# Patient Record
Sex: MALE | Race: WHITE | Employment: OTHER | URBAN - METROPOLITAN AREA
[De-identification: names, ages, dates, MRNs, and addresses within clinical notes are randomized per-mention and may not be internally consistent; named-entity substitution may affect disease eponyms.]

---

## 2024-06-26 ENCOUNTER — TELEPHONE (OUTPATIENT)
Dept: ORTHOPEDIC SURGERY | Age: 79
End: 2024-06-26

## 2024-06-26 NOTE — TELEPHONE ENCOUNTER
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

## 2024-07-01 ENCOUNTER — OFFICE VISIT (OUTPATIENT)
Dept: ORTHOPEDIC SURGERY | Age: 79
End: 2024-07-01
Payer: COMMERCIAL

## 2024-07-01 VITALS — HEIGHT: 69 IN | BODY MASS INDEX: 30.81 KG/M2 | WEIGHT: 208 LBS

## 2024-07-01 DIAGNOSIS — G89.29 CHRONIC PAIN OF LEFT KNEE: Primary | ICD-10-CM

## 2024-07-01 DIAGNOSIS — M25.562 CHRONIC PAIN OF LEFT KNEE: Primary | ICD-10-CM

## 2024-07-01 PROCEDURE — 1123F ACP DISCUSS/DSCN MKR DOCD: CPT | Performed by: EMERGENCY MEDICINE

## 2024-07-01 PROCEDURE — 99203 OFFICE O/P NEW LOW 30 MIN: CPT | Performed by: EMERGENCY MEDICINE

## 2024-07-01 RX ORDER — DAPAGLIFLOZIN 10 MG/1
10 TABLET, FILM COATED ORAL
COMMUNITY
Start: 2024-05-21

## 2024-07-01 RX ORDER — HYDROCHLOROTHIAZIDE 25 MG/1
25 TABLET ORAL DAILY
COMMUNITY
Start: 2021-04-21

## 2024-07-01 RX ORDER — MULTIVITAMIN
1 TABLET ORAL DAILY
COMMUNITY

## 2024-07-01 RX ORDER — VALSARTAN 160 MG/1
160 TABLET ORAL DAILY
COMMUNITY
Start: 2024-01-26

## 2024-07-01 ASSESSMENT — ENCOUNTER SYMPTOMS
ABDOMINAL PAIN: 0
SHORTNESS OF BREATH: 0

## 2024-07-01 NOTE — PROGRESS NOTES
presenting with left knee pain and feelings of instability with full extension.  He had a left total knee replacement by Dr. Bullard on 9/27/2022.  On exam, no overlying erythema or warmth without joint effusion.  Low suspicion for acute infection.  His x-ray reveals a laterally tilted patella, which could be the cause of the patient's pain, questionable whether he is having patellar subluxation, as the cause of his pain.  I would like for him to see an orthopedic surgeon for consultation regarding further workup and definitive management.  I will refer him to Dr. Cristobal Dobbs.  Patient felt comfortable with this plan and all questions were answered.    Orders Placed This Encounter   Procedures    XR KNEE LEFT (3 VIEWS)     Standing Status:   Future     Number of Occurrences:   1     Standing Expiration Date:   7/27/2024     Order Specific Question:   Reason for exam:     Answer:   Cristobal aFgan MD, Orthopedic Surgery (Hip; Knee), [facility]     Referral Priority:   Routine     Referral Type:   Eval and Treat     Referral Reason:   Specialty Services Required     Referred to Provider:   Cristobal Dobbs MD     Requested Specialty:   Orthopedic Surgery     Number of Visits Requested:   1       Follow-up:   Return for consultation with Dr. Dobbs.  Sooner with any problems, questions, concerns, or worsening symptoms.    I spent 35 minutes providing this service today, to include the time spent seeing the patient, documenting, and reviewing chart excluding any separately billed procedures.     Electronically signed by Adrianne Erazo MD on 7/1/2024 at 10:46 AM.    Disclaimer:  This note was dictated with voice recognition software.  Though review and correction are routine, we apologize for any errors.

## 2024-07-08 ENCOUNTER — OFFICE VISIT (OUTPATIENT)
Dept: ORTHOPEDIC SURGERY | Age: 79
End: 2024-07-08
Payer: COMMERCIAL

## 2024-07-08 VITALS — HEIGHT: 69 IN | WEIGHT: 208 LBS | BODY MASS INDEX: 30.81 KG/M2

## 2024-07-08 DIAGNOSIS — G89.29 CHRONIC PAIN OF LEFT KNEE: ICD-10-CM

## 2024-07-08 DIAGNOSIS — T84.093A FAILED TOTAL LEFT KNEE REPLACEMENT, INITIAL ENCOUNTER (HCC): Primary | ICD-10-CM

## 2024-07-08 DIAGNOSIS — M25.562 CHRONIC PAIN OF LEFT KNEE: ICD-10-CM

## 2024-07-08 PROBLEM — Z96.659 FAILED TOTAL KNEE ARTHROPLASTY (HCC): Status: ACTIVE | Noted: 2024-07-08

## 2024-07-08 PROBLEM — T84.018A FAILED TOTAL KNEE ARTHROPLASTY (HCC): Status: ACTIVE | Noted: 2024-07-08

## 2024-07-08 PROCEDURE — 99204 OFFICE O/P NEW MOD 45 MIN: CPT | Performed by: ORTHOPAEDIC SURGERY

## 2024-07-08 PROCEDURE — 1123F ACP DISCUSS/DSCN MKR DOCD: CPT | Performed by: ORTHOPAEDIC SURGERY

## 2024-07-08 RX ORDER — ACETAMINOPHEN 325 MG/1
1000 TABLET ORAL ONCE
OUTPATIENT
Start: 2024-07-08 | End: 2024-07-08

## 2024-07-08 RX ORDER — SODIUM CHLORIDE 9 MG/ML
INJECTION, SOLUTION INTRAVENOUS PRN
OUTPATIENT
Start: 2024-07-08

## 2024-07-08 RX ORDER — SODIUM CHLORIDE 0.9 % (FLUSH) 0.9 %
5-40 SYRINGE (ML) INJECTION PRN
OUTPATIENT
Start: 2024-07-08

## 2024-07-08 RX ORDER — CELECOXIB 100 MG/1
200 CAPSULE ORAL ONCE
OUTPATIENT
Start: 2024-07-08 | End: 2024-07-08

## 2024-07-08 RX ORDER — SODIUM CHLORIDE 0.9 % (FLUSH) 0.9 %
5-40 SYRINGE (ML) INJECTION EVERY 12 HOURS SCHEDULED
OUTPATIENT
Start: 2024-07-08

## 2024-07-09 NOTE — PROGRESS NOTES
Done  Katiuska Barcenas MD     Name_______________________________________Printed:____________________  Date and time of surgery__8/14 1100______________________Arrival Time:_0900____/per office____   1. The instructions given regarding when and if a patient needs to stop oral intake prior to surgery varies.Follow the specific instructions you were given                  _x__Nothing to eat or to drink after Midnight the night before.                   ____Carbo loading or instructions will be given to select patients-if you have been given those instructions -please do the following                           The evening before your surgery after dinner before midnight drink 40 ounces of gatorade.If you are diabetic use sugar free.  The morning of surgery drink 40 ounces of water.This needs to be finished 3 hours prior to your surgery start time.    2. Take the following pills with a small sip of water on the morning of nonesurgery___________________________________________________                  Do not take blood pressure medications ending in pril or sartan the clemente prior to surgery or the morning of surgery. Dr Barnes's patient are not to take any medications the AM of surgery.         3. Aspirin, Ibuprofen, Advil, Naproxen, Vitamin E and other Anti-inflammatory products and supplements should be stopped for 5 -7days before surgery or as directed by your physician.   4. Check with your Doctor regarding stopping Plavix, Coumadin,Eliquis, Lovenox,Effient,Pradaxa,Xarelto, Fragmin or other blood thinners and follow their instructions.ELIQUIS follow your drs orders   5. Do not smoke, and do not drink any alcoholic beverages 24 hours prior to surgery.  This includes NA Beer.Refrain from the usage of any recreational drugs.   6. You may brush your teeth and gargle the morning of surgery.  DO NOT SWALLOW WATER   7. You MUST make arrangements for a responsible adult to stay on site while you are here and take you home after your surgery. You will not  INSTRUCTIONS

## 2024-07-10 NOTE — PROGRESS NOTES
Patient: Emile Nieves  : 1945    MRN: [id number]    Date of Visit: 24    Attending Physician: Cristobal Dobbs MD    History of Present Illness  Mr.. Nieves is a very pleasant 78 y.o. patient with a several month history of left knee pain he describes anterior knee pain worse with getting up from a chair he specifically has difficulty getting on and off his tractor.    He underwent a left total knee arthroplasty about a year and 1/2 to 2 years ago he reports he did well initially however has had worsening discomfort over the last several months.  He denies fevers chills denies swelling erythema of the knee denies any mechanical symptoms.      PMH/PSH:  Past Medical History:   Diagnosis Date    Arthritis     Colon cancer (HCC)     Diastolic CHF, chronic (HCC)     History of pulmonary embolus (PE)     on Eliquis    Hypertension     Type II diabetes mellitus (HCC)      Patient Active Problem List   Diagnosis    Failed total knee arthroplasty (HCC)     Past Surgical History:   Procedure Laterality Date    EYE SURGERY      r cat    JOINT REPLACEMENT      right hip  l knee    SIGMOID COLECTOMY      with chemo       MEDS:  Scheduled Meds:  Continuous Infusions:  PRN Meds:  Current Meds:No current outpatient medications on file.      ALLERGIES:  No Known Allergies      Social History:   Social History     Socioeconomic History    Marital status: Single     Spouse name: Not on file    Number of children: Not on file    Years of education: Not on file    Highest education level: Not on file   Social Needs    Financial resource strain: Not on file    Food insecurity - worry: Not on file    Food insecurity - inability: Not on file    Transportation needs - medical: Not on file    Transportation needs - non-medical: Not on file   Occupational History    Not on file   Tobacco Use    Smoking status: Never Smoker    Smokeless tobacco: Never Used   Substance and Sexual Activity    Alcohol use: No

## 2024-07-15 ENCOUNTER — TELEPHONE (OUTPATIENT)
Dept: ORTHOPEDIC SURGERY | Age: 79
End: 2024-07-15

## 2024-07-15 DIAGNOSIS — M25.562 CHRONIC PAIN OF LEFT KNEE: Primary | ICD-10-CM

## 2024-07-15 DIAGNOSIS — G89.29 CHRONIC PAIN OF LEFT KNEE: Primary | ICD-10-CM

## 2024-07-15 NOTE — TELEPHONE ENCOUNTER
General Question     Subject: BLOOD WORK  Patient and /or Facility Request: Emile Nieves   Contact Number: [phone]     THE PT WANTS TO MAKE SURE THAT HIS BLOOD WORK ORDERS ARE SENT TO THE [facility] SO HE CAN GET HIS BLOOD WORK DRAWN THERE INSTEAD OF DRIVING TO .  PLEASE RETURN HIS CALL AT THE ABOVE #

## 2024-07-16 LAB
ALBUMIN: 4.2 G/DL (ref 3.5–5.2)
ALP BLD-CCNC: 86 IU/L (ref 40–129)
ALT SERPL-CCNC: 34 IU/L (ref 10–40)
ANION GAP SERPL CALCULATED.3IONS-SCNC: 14 MMOL/L (ref 4–16)
APTT: 27.5 SECONDS (ref 23.6–34)
AST SERPL-CCNC: 41 IU/L (ref 10–40)
BASOPHILS ABSOLUTE: 0 %
BASOPHILS ABSOLUTE: 0 THOU/MCL (ref 0–0.2)
BILIRUB SERPL-MCNC: 0.5 MG/DL (ref 0–1.2)
BUN BLDV-MCNC: 16 MG/DL (ref 8–26)
CALCIUM SERPL-MCNC: 10.4 MG/DL (ref 8.5–10.4)
CHLORIDE BLD-SCNC: 103 MEQ/L (ref 98–111)
CO2: 25 MMOL/L (ref 21–31)
CREAT SERPL-MCNC: 1.02 MG/DL (ref 0.7–1.3)
EGFR (CKD-EPI): 75 ML/MIN/1.73 M2
EOSINOPHILS ABSOLUTE: 0.1 THOU/MCL (ref 0.03–0.45)
EOSINOPHILS RELATIVE PERCENT: 2 %
ESTIMATED AVERAGE GLUCOSE: 163 MG/DL
GLUCOSE BLD-MCNC: 91 MG/DL (ref 70–99)
HBA1C MFR BLD: 7.3 % (ref 4–6)
HCT VFR BLD CALC: 44.2 % (ref 40–50)
HEMOGLOBIN: 14.9 G/DL (ref 13.5–16.5)
INR BLD: 1 (ref 0.8–1.2)
LYMPHOCYTES ABSOLUTE: 1.6 THOU/MCL (ref 1–4)
LYMPHOCYTES RELATIVE PERCENT: 26 %
MCH RBC QN AUTO: 31.5 PG (ref 27–33)
MCHC RBC AUTO-ENTMCNC: 33.8 G/DL (ref 32–36)
MCV RBC AUTO: 93.2 FL (ref 82–97)
MONOCYTES ABSOLUTE: 0.7 THOU/MCL (ref 0.2–0.9)
MONOCYTES RELATIVE PERCENT: 10 %
NEUTROPHILS ABSOLUTE: 3.9 THOU/MCL (ref 1.8–7.7)
PDW BLD-RTO: 13.3 %
PLATELET # BLD: 197 THOU/MCL (ref 140–375)
PMV BLD AUTO: 7.6 FL (ref 7.4–11.5)
POTASSIUM SERPL-SCNC: 3.4 MEQ/L (ref 3.6–5.1)
PROTHROMBIN TIME: 13.6 SECONDS (ref 11.7–14.2)
RBC # BLD: 4.74 MIL/MCL (ref 4.4–5.8)
SEG NEUTROPHILS: 62 %
SODIUM BLD-SCNC: 142 MEQ/L (ref 135–145)
STAPH AUREUS CAPSULAR POLYSACCHARIDE ENZYME GENE: NEGATIVE
STAPH AUREUS.METHICILLIN RESISTANT DNA: NEGATIVE
TOTAL PROTEIN: 7.4 G/DL (ref 6.4–8.3)
VITAMIN D 25-HYDROXY: 39.3 NG/ML
WBC # BLD: 6.4 THOU/MCL (ref 3.6–10.5)

## 2024-08-08 ENCOUNTER — OFFICE VISIT (OUTPATIENT)
Dept: ORTHOPEDIC SURGERY | Age: 79
End: 2024-08-08
Payer: COMMERCIAL

## 2024-08-08 DIAGNOSIS — Z96.659 FAILURE OF TOTAL KNEE REPLACEMENT, SUBSEQUENT ENCOUNTER: Primary | ICD-10-CM

## 2024-08-08 DIAGNOSIS — T84.018D FAILURE OF TOTAL KNEE REPLACEMENT, SUBSEQUENT ENCOUNTER: Primary | ICD-10-CM

## 2024-08-08 PROCEDURE — 1123F ACP DISCUSS/DSCN MKR DOCD: CPT | Performed by: ORTHOPAEDIC SURGERY

## 2024-08-08 PROCEDURE — 99214 OFFICE O/P EST MOD 30 MIN: CPT | Performed by: ORTHOPAEDIC SURGERY

## 2024-08-08 NOTE — PROGRESS NOTES
Patient: Emile Nieves  : 1945    MRN: [id number]    Date of Visit: 24    Attending Physician: Cristobal Dobbs MD    History of Present Illness  Mr.. Nieves is a very pleasant 78 y.o. patient with a several month history of left knee pain he describes anterior knee pain worse with getting up from a chair he specifically has difficulty getting on and off his tractor.    He underwent a left total knee arthroplasty about a year and 1/2 to 2 years ago he reports he did well initially however has had worsening discomfort over the last several months.  He denies fevers chills denies swelling erythema of the knee denies any mechanical symptoms.    I am seeing her back today to review and discuss her preoperative lab work and to further discuss the perioperative process of TJA.         PMH/PSH:  Past Medical History:   Diagnosis Date    Arthritis     Colon cancer (HCC)     Diastolic CHF, chronic (HCC)     History of pulmonary embolus (PE)     on Eliquis    Hypertension     Type II diabetes mellitus (HCC)      Patient Active Problem List   Diagnosis    Failed total knee arthroplasty (HCC)     Past Surgical History:   Procedure Laterality Date    EYE SURGERY      r cat    JOINT REPLACEMENT      right hip  l knee    SIGMOID COLECTOMY      with chemo       MEDS:  Scheduled Meds:  Continuous Infusions:  PRN Meds:  Current Meds:No current outpatient medications on file.      ALLERGIES:  No Known Allergies      Social History:   Social History     Socioeconomic History    Marital status: Single     Spouse name: Not on file    Number of children: Not on file    Years of education: Not on file    Highest education level: Not on file   Social Needs    Financial resource strain: Not on file    Food insecurity - worry: Not on file    Food insecurity - inability: Not on file    Transportation needs - medical: Not on file    Transportation needs - non-medical: Not on file   Occupational History    Not

## 2024-08-14 ENCOUNTER — APPOINTMENT (OUTPATIENT)
Dept: GENERAL RADIOLOGY | Age: 79
DRG: 313 | End: 2024-08-14
Attending: ORTHOPAEDIC SURGERY
Payer: COMMERCIAL

## 2024-08-14 ENCOUNTER — ANESTHESIA (OUTPATIENT)
Dept: OPERATING ROOM | Age: 79
DRG: 313 | End: 2024-08-14
Payer: COMMERCIAL

## 2024-08-14 ENCOUNTER — ANESTHESIA EVENT (OUTPATIENT)
Dept: OPERATING ROOM | Age: 79
DRG: 313 | End: 2024-08-14
Payer: COMMERCIAL

## 2024-08-14 ENCOUNTER — HOSPITAL ENCOUNTER (INPATIENT)
Age: 79
LOS: 1 days | Discharge: HOME | DRG: 313 | End: 2024-08-14
Attending: ORTHOPAEDIC SURGERY | Admitting: ORTHOPAEDIC SURGERY
Payer: COMMERCIAL

## 2024-08-14 VITALS
TEMPERATURE: 97.5 F | SYSTOLIC BLOOD PRESSURE: 108 MMHG | OXYGEN SATURATION: 94 % | DIASTOLIC BLOOD PRESSURE: 67 MMHG | HEART RATE: 80 BPM | RESPIRATION RATE: 12 BRPM | WEIGHT: 206 LBS | HEIGHT: 69 IN | BODY MASS INDEX: 30.51 KG/M2

## 2024-08-14 DIAGNOSIS — T84.093A FAILED TOTAL LEFT KNEE REPLACEMENT, INITIAL ENCOUNTER (HCC): Primary | ICD-10-CM

## 2024-08-14 LAB
ABO + RH BLD: NORMAL
BLD GP AB SCN SERPL QL: NORMAL
GLUCOSE BLD-MCNC: 113 MG/DL (ref 70–99)
GLUCOSE BLD-MCNC: 86 MG/DL (ref 70–99)
PERFORMED ON: ABNORMAL
PERFORMED ON: NORMAL

## 2024-08-14 PROCEDURE — 3600000004 HC SURGERY LEVEL 4 BASE: Performed by: ORTHOPAEDIC SURGERY

## 2024-08-14 PROCEDURE — 7100000001 HC PACU RECOVERY - ADDTL 15 MIN: Performed by: ORTHOPAEDIC SURGERY

## 2024-08-14 PROCEDURE — 7100000011 HC PHASE II RECOVERY - ADDTL 15 MIN: Performed by: ORTHOPAEDIC SURGERY

## 2024-08-14 PROCEDURE — 6360000002 HC RX W HCPCS: Performed by: ORTHOPAEDIC SURGERY

## 2024-08-14 PROCEDURE — 3700000001 HC ADD 15 MINUTES (ANESTHESIA): Performed by: ORTHOPAEDIC SURGERY

## 2024-08-14 PROCEDURE — 2500000003 HC RX 250 WO HCPCS: Performed by: NURSE ANESTHETIST, CERTIFIED REGISTERED

## 2024-08-14 PROCEDURE — 2580000003 HC RX 258: Performed by: ORTHOPAEDIC SURGERY

## 2024-08-14 PROCEDURE — 86900 BLOOD TYPING SEROLOGIC ABO: CPT

## 2024-08-14 PROCEDURE — 1200000000 HC SEMI PRIVATE

## 2024-08-14 PROCEDURE — 6370000000 HC RX 637 (ALT 250 FOR IP): Performed by: ANESTHESIOLOGY

## 2024-08-14 PROCEDURE — 86850 RBC ANTIBODY SCREEN: CPT

## 2024-08-14 PROCEDURE — 0QBF0ZZ EXCISION OF LEFT PATELLA, OPEN APPROACH: ICD-10-PCS | Performed by: ORTHOPAEDIC SURGERY

## 2024-08-14 PROCEDURE — 6360000002 HC RX W HCPCS: Performed by: NURSE ANESTHETIST, CERTIFIED REGISTERED

## 2024-08-14 PROCEDURE — 6370000000 HC RX 637 (ALT 250 FOR IP): Performed by: ORTHOPAEDIC SURGERY

## 2024-08-14 PROCEDURE — 27425 LAT RETINACULAR RELEASE OPEN: CPT | Performed by: ORTHOPAEDIC SURGERY

## 2024-08-14 PROCEDURE — 97161 PT EVAL LOW COMPLEX 20 MIN: CPT

## 2024-08-14 PROCEDURE — 97535 SELF CARE MNGMENT TRAINING: CPT

## 2024-08-14 PROCEDURE — 3600000014 HC SURGERY LEVEL 4 ADDTL 15MIN: Performed by: ORTHOPAEDIC SURGERY

## 2024-08-14 PROCEDURE — 97530 THERAPEUTIC ACTIVITIES: CPT

## 2024-08-14 PROCEDURE — 73560 X-RAY EXAM OF KNEE 1 OR 2: CPT

## 2024-08-14 PROCEDURE — 27350 REMOVAL OF KNEECAP: CPT | Performed by: ORTHOPAEDIC SURGERY

## 2024-08-14 PROCEDURE — 7100000010 HC PHASE II RECOVERY - FIRST 15 MIN: Performed by: ORTHOPAEDIC SURGERY

## 2024-08-14 PROCEDURE — 86901 BLOOD TYPING SEROLOGIC RH(D): CPT

## 2024-08-14 PROCEDURE — 2500000003 HC RX 250 WO HCPCS: Performed by: ORTHOPAEDIC SURGERY

## 2024-08-14 PROCEDURE — 36415 COLL VENOUS BLD VENIPUNCTURE: CPT

## 2024-08-14 PROCEDURE — 2709999900 HC NON-CHARGEABLE SUPPLY: Performed by: ORTHOPAEDIC SURGERY

## 2024-08-14 PROCEDURE — 3700000000 HC ANESTHESIA ATTENDED CARE: Performed by: ORTHOPAEDIC SURGERY

## 2024-08-14 PROCEDURE — 7100000000 HC PACU RECOVERY - FIRST 15 MIN: Performed by: ORTHOPAEDIC SURGERY

## 2024-08-14 PROCEDURE — 97116 GAIT TRAINING THERAPY: CPT

## 2024-08-14 PROCEDURE — 0MNP0ZZ RELEASE LEFT KNEE BURSA AND LIGAMENT, OPEN APPROACH: ICD-10-PCS | Performed by: ORTHOPAEDIC SURGERY

## 2024-08-14 PROCEDURE — 97165 OT EVAL LOW COMPLEX 30 MIN: CPT

## 2024-08-14 PROCEDURE — 27350 REMOVAL OF KNEECAP: CPT | Performed by: PHYSICIAN ASSISTANT

## 2024-08-14 RX ORDER — LABETALOL HYDROCHLORIDE 5 MG/ML
10 INJECTION, SOLUTION INTRAVENOUS
Status: DISCONTINUED | OUTPATIENT
Start: 2024-08-14 | End: 2024-08-14 | Stop reason: HOSPADM

## 2024-08-14 RX ORDER — HYDROMORPHONE HYDROCHLORIDE 2 MG/ML
0.5 INJECTION, SOLUTION INTRAMUSCULAR; INTRAVENOUS; SUBCUTANEOUS EVERY 5 MIN PRN
Status: DISCONTINUED | OUTPATIENT
Start: 2024-08-14 | End: 2024-08-14 | Stop reason: HOSPADM

## 2024-08-14 RX ORDER — MAGNESIUM HYDROXIDE 1200 MG/15ML
LIQUID ORAL CONTINUOUS PRN
Status: COMPLETED | OUTPATIENT
Start: 2024-08-14 | End: 2024-08-14

## 2024-08-14 RX ORDER — ASPIRIN 81 MG/1
81 TABLET ORAL 2 TIMES DAILY
Status: CANCELLED | OUTPATIENT
Start: 2024-08-14

## 2024-08-14 RX ORDER — HYDRALAZINE HYDROCHLORIDE 20 MG/ML
10 INJECTION INTRAMUSCULAR; INTRAVENOUS
Status: DISCONTINUED | OUTPATIENT
Start: 2024-08-14 | End: 2024-08-14 | Stop reason: HOSPADM

## 2024-08-14 RX ORDER — ONDANSETRON 2 MG/ML
INJECTION INTRAMUSCULAR; INTRAVENOUS PRN
Status: DISCONTINUED | OUTPATIENT
Start: 2024-08-14 | End: 2024-08-14 | Stop reason: SDUPTHER

## 2024-08-14 RX ORDER — HYDROCHLOROTHIAZIDE 25 MG/1
25 TABLET ORAL DAILY
Status: CANCELLED | OUTPATIENT
Start: 2024-08-15

## 2024-08-14 RX ORDER — ROCURONIUM BROMIDE 10 MG/ML
INJECTION, SOLUTION INTRAVENOUS PRN
Status: DISCONTINUED | OUTPATIENT
Start: 2024-08-14 | End: 2024-08-14 | Stop reason: SDUPTHER

## 2024-08-14 RX ORDER — SODIUM CHLORIDE 0.9 % (FLUSH) 0.9 %
5-40 SYRINGE (ML) INJECTION EVERY 12 HOURS SCHEDULED
Status: CANCELLED | OUTPATIENT
Start: 2024-08-14

## 2024-08-14 RX ORDER — FAMOTIDINE 20 MG/1
20 TABLET, FILM COATED ORAL 2 TIMES DAILY
Status: CANCELLED | OUTPATIENT
Start: 2024-08-14

## 2024-08-14 RX ORDER — ONDANSETRON 2 MG/ML
4 INJECTION INTRAMUSCULAR; INTRAVENOUS
Status: DISCONTINUED | OUTPATIENT
Start: 2024-08-14 | End: 2024-08-14 | Stop reason: HOSPADM

## 2024-08-14 RX ORDER — PROPOFOL 10 MG/ML
INJECTION, EMULSION INTRAVENOUS PRN
Status: DISCONTINUED | OUTPATIENT
Start: 2024-08-14 | End: 2024-08-14 | Stop reason: SDUPTHER

## 2024-08-14 RX ORDER — SODIUM CHLORIDE 0.9 % (FLUSH) 0.9 %
5-40 SYRINGE (ML) INJECTION PRN
Status: CANCELLED | OUTPATIENT
Start: 2024-08-14

## 2024-08-14 RX ORDER — KETOROLAC TROMETHAMINE 30 MG/ML
15 INJECTION, SOLUTION INTRAMUSCULAR; INTRAVENOUS EVERY 6 HOURS
Status: CANCELLED | OUTPATIENT
Start: 2024-08-14 | End: 2024-08-15

## 2024-08-14 RX ORDER — ACETAMINOPHEN 500 MG
1000 TABLET ORAL 3 TIMES DAILY
Qty: 84 TABLET | Refills: 0 | Status: SHIPPED | OUTPATIENT
Start: 2024-08-14

## 2024-08-14 RX ORDER — VALSARTAN 160 MG/1
160 TABLET ORAL DAILY
Status: CANCELLED | OUTPATIENT
Start: 2024-08-15

## 2024-08-14 RX ORDER — 0.9 % SODIUM CHLORIDE 0.9 %
1000 INTRAVENOUS SOLUTION INTRAVENOUS ONCE
Status: CANCELLED | OUTPATIENT
Start: 2024-08-14 | End: 2024-08-14

## 2024-08-14 RX ORDER — ACETAMINOPHEN 500 MG
1000 TABLET ORAL ONCE
Status: COMPLETED | OUTPATIENT
Start: 2024-08-14 | End: 2024-08-14

## 2024-08-14 RX ORDER — NALOXONE HYDROCHLORIDE 0.4 MG/ML
INJECTION, SOLUTION INTRAMUSCULAR; INTRAVENOUS; SUBCUTANEOUS PRN
Status: DISCONTINUED | OUTPATIENT
Start: 2024-08-14 | End: 2024-08-14 | Stop reason: HOSPADM

## 2024-08-14 RX ORDER — GLIPIZIDE 10 MG/1
10 TABLET ORAL
COMMUNITY

## 2024-08-14 RX ORDER — SENNA AND DOCUSATE SODIUM 50; 8.6 MG/1; MG/1
1 TABLET, FILM COATED ORAL DAILY
Qty: 14 TABLET | Refills: 0 | Status: SHIPPED | OUTPATIENT
Start: 2024-08-14

## 2024-08-14 RX ORDER — GLIPIZIDE 5 MG/1
10 TABLET ORAL
Status: CANCELLED | OUTPATIENT
Start: 2024-08-15

## 2024-08-14 RX ORDER — DIPHENHYDRAMINE HYDROCHLORIDE 50 MG/ML
25 INJECTION INTRAMUSCULAR; INTRAVENOUS EVERY 6 HOURS PRN
Status: CANCELLED | OUTPATIENT
Start: 2024-08-14

## 2024-08-14 RX ORDER — DEXTROSE MONOHYDRATE 100 MG/ML
INJECTION, SOLUTION INTRAVENOUS CONTINUOUS PRN
Status: CANCELLED | OUTPATIENT
Start: 2024-08-14

## 2024-08-14 RX ORDER — FENTANYL CITRATE 50 UG/ML
INJECTION, SOLUTION INTRAMUSCULAR; INTRAVENOUS PRN
Status: DISCONTINUED | OUTPATIENT
Start: 2024-08-14 | End: 2024-08-14 | Stop reason: SDUPTHER

## 2024-08-14 RX ORDER — SODIUM CHLORIDE 9 MG/ML
INJECTION, SOLUTION INTRAVENOUS PRN
Status: DISCONTINUED | OUTPATIENT
Start: 2024-08-14 | End: 2024-08-14 | Stop reason: HOSPADM

## 2024-08-14 RX ORDER — SENNA AND DOCUSATE SODIUM 50; 8.6 MG/1; MG/1
1 TABLET, FILM COATED ORAL 2 TIMES DAILY
Status: CANCELLED | OUTPATIENT
Start: 2024-08-14

## 2024-08-14 RX ORDER — FAMOTIDINE 10 MG/ML
20 INJECTION, SOLUTION INTRAVENOUS 2 TIMES DAILY
Status: CANCELLED | OUTPATIENT
Start: 2024-08-14

## 2024-08-14 RX ORDER — OXYCODONE HYDROCHLORIDE 5 MG/1
5 TABLET ORAL EVERY 4 HOURS PRN
Qty: 42 TABLET | Refills: 0 | Status: SHIPPED | OUTPATIENT
Start: 2024-08-14 | End: 2024-08-21

## 2024-08-14 RX ORDER — VANCOMYCIN HYDROCHLORIDE 1 G/20ML
INJECTION, POWDER, LYOPHILIZED, FOR SOLUTION INTRAVENOUS
Status: COMPLETED | OUTPATIENT
Start: 2024-08-14 | End: 2024-08-14

## 2024-08-14 RX ORDER — OXYCODONE HYDROCHLORIDE 5 MG/1
5 TABLET ORAL EVERY 4 HOURS PRN
Status: CANCELLED | OUTPATIENT
Start: 2024-08-14

## 2024-08-14 RX ORDER — SODIUM CHLORIDE 9 MG/ML
INJECTION, SOLUTION INTRAVENOUS CONTINUOUS
Status: CANCELLED | OUTPATIENT
Start: 2024-08-14

## 2024-08-14 RX ORDER — DIPHENHYDRAMINE HCL 25 MG
25 TABLET ORAL EVERY 6 HOURS PRN
Status: CANCELLED | OUTPATIENT
Start: 2024-08-14

## 2024-08-14 RX ORDER — DEXAMETHASONE SODIUM PHOSPHATE 4 MG/ML
INJECTION, SOLUTION INTRA-ARTICULAR; INTRALESIONAL; INTRAMUSCULAR; INTRAVENOUS; SOFT TISSUE PRN
Status: DISCONTINUED | OUTPATIENT
Start: 2024-08-14 | End: 2024-08-14 | Stop reason: SDUPTHER

## 2024-08-14 RX ORDER — SODIUM CHLORIDE 0.9 % (FLUSH) 0.9 %
5-40 SYRINGE (ML) INJECTION EVERY 12 HOURS SCHEDULED
Status: DISCONTINUED | OUTPATIENT
Start: 2024-08-14 | End: 2024-08-14 | Stop reason: HOSPADM

## 2024-08-14 RX ORDER — ACETAMINOPHEN 500 MG
1000 TABLET ORAL 3 TIMES DAILY
Status: CANCELLED | OUTPATIENT
Start: 2024-08-14

## 2024-08-14 RX ORDER — OXYCODONE HYDROCHLORIDE 5 MG/1
5 TABLET ORAL
Status: COMPLETED | OUTPATIENT
Start: 2024-08-14 | End: 2024-08-14

## 2024-08-14 RX ORDER — SODIUM CHLORIDE 0.9 % (FLUSH) 0.9 %
5-40 SYRINGE (ML) INJECTION PRN
Status: DISCONTINUED | OUTPATIENT
Start: 2024-08-14 | End: 2024-08-14 | Stop reason: HOSPADM

## 2024-08-14 RX ORDER — CELECOXIB 200 MG/1
200 CAPSULE ORAL ONCE
Status: COMPLETED | OUTPATIENT
Start: 2024-08-14 | End: 2024-08-14

## 2024-08-14 RX ORDER — LIDOCAINE HYDROCHLORIDE 20 MG/ML
INJECTION, SOLUTION EPIDURAL; INFILTRATION; INTRACAUDAL; PERINEURAL PRN
Status: DISCONTINUED | OUTPATIENT
Start: 2024-08-14 | End: 2024-08-14 | Stop reason: SDUPTHER

## 2024-08-14 RX ORDER — DEXMEDETOMIDINE HYDROCHLORIDE 100 UG/ML
INJECTION, SOLUTION INTRAVENOUS PRN
Status: DISCONTINUED | OUTPATIENT
Start: 2024-08-14 | End: 2024-08-14 | Stop reason: SDUPTHER

## 2024-08-14 RX ORDER — SODIUM CHLORIDE 9 MG/ML
INJECTION, SOLUTION INTRAVENOUS PRN
Status: CANCELLED | OUTPATIENT
Start: 2024-08-14

## 2024-08-14 RX ORDER — GLUCAGON 1 MG/ML
1 KIT INJECTION PRN
Status: CANCELLED | OUTPATIENT
Start: 2024-08-14

## 2024-08-14 RX ADMIN — PHENYLEPHRINE HYDROCHLORIDE 100 MCG: 10 INJECTION INTRAVENOUS at 11:41

## 2024-08-14 RX ADMIN — CELECOXIB 200 MG: 200 CAPSULE ORAL at 09:19

## 2024-08-14 RX ADMIN — OXYCODONE HYDROCHLORIDE 5 MG: 5 TABLET ORAL at 13:31

## 2024-08-14 RX ADMIN — FENTANYL CITRATE 100 MCG: 50 INJECTION, SOLUTION INTRAMUSCULAR; INTRAVENOUS at 11:01

## 2024-08-14 RX ADMIN — ROCURONIUM BROMIDE 50 MG: 10 INJECTION, SOLUTION INTRAVENOUS at 11:01

## 2024-08-14 RX ADMIN — PROPOFOL 150 MG: 10 INJECTION, EMULSION INTRAVENOUS at 11:01

## 2024-08-14 RX ADMIN — DEXAMETHASONE SODIUM PHOSPHATE 8 MG: 4 INJECTION, SOLUTION INTRAMUSCULAR; INTRAVENOUS at 11:04

## 2024-08-14 RX ADMIN — DEXMEDETOMIDINE HYDROCHLORIDE 10 MCG: 100 INJECTION, SOLUTION INTRAVENOUS at 11:59

## 2024-08-14 RX ADMIN — ONDANSETRON 4 MG: 2 INJECTION INTRAMUSCULAR; INTRAVENOUS at 11:04

## 2024-08-14 RX ADMIN — CEFAZOLIN 2000 MG: 2 INJECTION, POWDER, FOR SOLUTION INTRAMUSCULAR; INTRAVENOUS at 11:12

## 2024-08-14 RX ADMIN — LIDOCAINE HYDROCHLORIDE 100 MG: 20 INJECTION, SOLUTION EPIDURAL; INFILTRATION; INTRACAUDAL; PERINEURAL at 11:01

## 2024-08-14 RX ADMIN — PHENYLEPHRINE HYDROCHLORIDE 100 MCG: 10 INJECTION INTRAVENOUS at 11:59

## 2024-08-14 RX ADMIN — PHENYLEPHRINE HYDROCHLORIDE 100 MCG: 10 INJECTION INTRAVENOUS at 11:50

## 2024-08-14 RX ADMIN — ACETAMINOPHEN 1000 MG: 500 TABLET ORAL at 09:19

## 2024-08-14 RX ADMIN — SUGAMMADEX 200 MG: 100 INJECTION, SOLUTION INTRAVENOUS at 12:03

## 2024-08-14 RX ADMIN — SODIUM CHLORIDE: 9 INJECTION, SOLUTION INTRAVENOUS at 09:38

## 2024-08-14 ASSESSMENT — PAIN SCALES - GENERAL
PAINLEVEL_OUTOF10: 4
PAINLEVEL_OUTOF10: 0

## 2024-08-14 ASSESSMENT — PAIN - FUNCTIONAL ASSESSMENT: PAIN_FUNCTIONAL_ASSESSMENT: 0-10

## 2024-08-14 ASSESSMENT — LIFESTYLE VARIABLES: SMOKING_STATUS: 0

## 2024-08-14 NOTE — DISCHARGE INSTRUCTIONS
Dr. Dobbs Post-op Instructions     Follow-up with Dr. Dobbs in 2 weeks; [phone] ([facility])    Incision care  Should your incision start to drain let our office know.  Continue jose hose on the operative leg (this should be thigh high) during the day, can remove at night.   Okay to shower tomorrow, as long as your post-op waterproof dressing is still in place.  Do not tub bathe or submerge your incision in water.  Should your incision start to drain, let our office know.  You may remove your Therabond dressing on post-op day 7.  Prior to any wound care please wash and dry your hands.  During the day, continue to wear your JOSE stocking on the operative leg. Take the stocking off at night. You do not need to wear a stocking on your non-operative leg. You should wear the compression stocking until your post-op visit, this keeps lower extremity swelling to a minimum and reduces joint stiffness.      Exercise  Unless told otherwise, your operative leg is \"weight bearing as tolerated\".  This means you can put all of your weight on your surgery leg.  You may progress off support as tolerated.   DO NOT place a pillow under your knee.  To elevate the operative leg, elevate the ENTIRE leg.  The knee should not rest in a bent position.  Walk for 5-10 minutes every hour while awake.  Ask your surgeon's office for  FMLA paperwork or a return to work note.    Ice  For pain and swelling, put ice or a cold pack on the area for 10 to 20 minutes at a time. Put a thin cloth between the ice and your skin such as a clean pillow case or thin towel.    Diet  Resume your home diet  Prevent constipation    Medicines   Upon discharge resume your home medications. Take all medications as prescribed. Take a stool softener (Senna/Colace) if taking narcotic pain medications. Stool softeners are only effective if you are adequately hydrated.  Try and drink 6-8 glasses of water or fluids a day, unless otherwise contraindicated.  blood, light headedness, feelings of apprehension. If you experience any of these symptoms call the office or go to the closest ER.     Call 911 anytime you think you may need emergency care. For example, call if:    You passed out (lost consciousness).     You have severe trouble breathing.     You have sudden chest pain and shortness of breath, or you cough up blood.   Call your doctor now or seek immediate medical care if:    You have signs of infection, such as:  Increased pain, swelling, warmth, or redness.  Red streaks leading from the incision.  Pus draining from the incision.  A fever over 101     Sharp calf pain     Your incision comes open and begins to bleed, or the bleeding increases.  If you fall, especially if you are on blood thinners     You have pain that is not controlled with medications, rest, or ice         If you have a medical emergency please call 911 or seek immediate medical help.  [facility]  [address] [place] [address]  [phone]    ANESTHESIA DISCHARGE INSTRUCTIONS    Wear your seatbelt home.  You are under the influence of drugs-do not drink alcohol, drive, operate machinery, make any important decisions or sign any legal documents for 24 hours.  Children should not ride bikes, skate boards or play on gym sets for 24 hours after surgery.  A responsible adult needs to be with you for 24 hours.  You may experience lightheadedness, dizziness, or sleepiness following surgery.  Rest at home today- increase activity as tolerated.  Progress slowly to a regular diet unless your physician has instructed you otherwise. Drink plenty of water.  If persistent nausea and vomiting becomes a problem, call your physician.  Coughing, sore throat and muscle aches are other side effects of anesthesia, and should improve with time.  Do not drive or operate machinery while taking narcotics.  Females of childbearing potential and on hormonal birth control, should use two forms

## 2024-08-14 NOTE — PROGRESS NOTES
Patient to PACU from OR. Drowsy. VSS. Surgical dressing with knee immoblizer in place to left leg. + 1 bilateral pedal pulses. Will monitor.

## 2024-08-14 NOTE — PROGRESS NOTES
Patient is alert, comfortable, VSS. Tolerating PO. Phase 1 recovery completed, will transition to phase 2.

## 2024-08-14 NOTE — PROGRESS NOTES
[facility] - Inpatient Rehabilitation Department   Phone: [phone]    Physical Therapy    [x] Initial Evaluation            [] Daily Treatment Note         [x] Discharge Summary      Patient: Emile Nieves   : 1945   MRN: [id number]   Date of Service:  2024  Admitting Diagnosis: Failed total knee arthroplasty (HCC)  Current Admission Summary: Pt s/p L TKA 24.   Past Medical History:  has a past medical history of Arthritis, Colon cancer (HCC), Diastolic CHF, chronic (HCC), History of pulmonary embolus (PE), Hypertension, and Type II diabetes mellitus (HCC).  Past Surgical History:  has a past surgical history that includes joint replacement; Sigmoid Colectomy; and eye surgery.    Discharge Recommendations: Emile Nieves scored a 19/ on the AM-PAC short mobility form. Current research shows that an AM-PAC score of 18 or greater is typically associated with a discharge to the patient's home setting. Based on the patient's AM-PAC score and their current functional mobility deficits, it is recommended that the patient have 2-3 sessions per week of Physical Therapy at d/c to increase the patient's independence.  At this time, this patient demonstrates the endurance and safety to discharge home with HHPT and a follow up treatment frequency of 2-3x/wk.  Please see assessment section for further patient specific details.    HOME HEALTH CARE: LEVEL 3 SAFETY  - Initial home health evaluation to occur within 24-48 hours, in patient home   - Therapy evaluations in home within 24-48 hours of discharge; including DME and home safety   - Frontload therapy 5 days, then 3x a week   - Therapy to evaluate if patient has Home Health Aide needs for personal care   -  evaluation within 24-48 hours, includes evaluation of resources and insurance to determine [place], [place], LTC, and Medicaid options      If patient discharges prior to next session this note will serve as a discharge  summary.  Please see below for the latest assessment towards goals.      DME Required For Discharge: rolling walker  Precautions/Restrictions: high fall risk, weight bearing  Weight Bearing Restrictions: weight bearing as tolerated  [] Right Upper Extremity  [] Left Upper Extremity [] Right Lower Extremity  [x] Left Lower Extremity     Required Braces/Orthotics: knee immobilizer (PACU RN reporting MD states KI for LLE when OOB until follow-up visit)   [] Right  [x] Left  Positional Restrictions:no positional restrictions    Pre-Admission Information   Lives With:  brother     Type of Home: house  Home Layout: two level, able to live on main level  Home Access: ramped entry  Bathroom Layout: tub/shower unit  Bathroom Equipment: grab bars in shower  Toilet Height: standard height  Home Equipment: standard walker, quad cane  Transfer Assistance: modified independent with use of QC  Ambulation Assistance:modified independent with use of QC  ADL Assistance: independent with all ADL's  IADL Assistance: independent with homemaking tasks  Active :        [x] Yes  [] No  Hand Dominance: [] Left  [x] Right  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   Posture:   Good  Sensation:   denies numbness and tingling  Tone:   Normotonic  ROM:   RLE WFL; L hip and ankle WFL, unable to assess knee due to KI  Strength:   RLE grossly 3+/5; LLE deferred due to surgery  Therapist Clinical Decision Making (Complexity): low complexity  Clinical Presentation: stable      Subjective  General: Pt semi-fowlers in bed upon arrival. Agreeable to PT/OT eval, requesting to use bathroom.   Pain: 0/10  Pain Interventions: not

## 2024-08-14 NOTE — INTERVAL H&P NOTE
Update History & Physical    The patient's History and Physical of August 2, 2024 was reviewed with the patient and I examined the patient. There was no change. The surgical site was confirmed by the patient and me.     Plan: The risks, benefits, expected outcome, and alternative to the recommended procedure have been discussed with the patient. Patient understands and wants to proceed with the procedure.     Electronically signed by Cristobal Dobbs MD on 8/14/2024 at 9:52 AM

## 2024-08-14 NOTE — PROGRESS NOTES
[facility] - Inpatient Rehabilitation Department   Phone: [phone]    Occupational Therapy    [x] Initial Evaluation            [] Daily Treatment Note         [x] Discharge Summary      Patient: Emile Nieves   : 1945   MRN: [id number]   Date of Service:  2024    Admitting Diagnosis:  Failed total knee arthroplasty (HCC)  Current Admission Summary: Pt s/p L TKA 24.   Past Medical History:  has a past medical history of Arthritis, Colon cancer (HCC), Diastolic CHF, chronic (HCC), History of pulmonary embolus (PE), Hypertension, and Type II diabetes mellitus (HCC).  Past Surgical History:  has a past surgical history that includes joint replacement; Sigmoid Colectomy; and eye surgery.    Discharge Recommendations:Emile Nieves scored a  / on the AM-PAC ADL Inpatient form. Current research shows that an AM-PAC score of 18 or greater is typically associated with a discharge to the patient's home setting. Based on the patient's AM-PAC score, and their current ADL deficits, it is recommended that the patient have 2-3 sessions per week of Occupational Therapy at d/c to increase the patient's independence.  At this time, this patient demonstrates the endurance and safety to discharge home with outpatient OT (home vs OP services) and a follow up treatment frequency of 2-3x/wk.   Please see assessment section for further patient specific details.    If patient discharges prior to next session this note will serve as a discharge summary.  Please see below for the latest assessment towards goals.       DME Required For Discharge: Rolling Walker    Precautions/Restrictions: medium fall risk  Positional Restrictions:no positional restrictions  Required Braces/Orthotics: knee immobilizer (PACU RN reporting MD states KI for LLE when OOB until follow-up visit)                   [] Right            [x] Left    Pre-Admission Information   Lives With:  brother                 Type of Home:

## 2024-08-14 NOTE — ANESTHESIA POSTPROCEDURE EVALUATION
Department of Anesthesiology  Postprocedure Note    Patient: Emile Nieves  MRN: [id number]  YOB: 1945  Date of evaluation: 8/14/2024    Procedure Summary       Date: 08/14/24 Room / Location: [address]    Anesthesia Start: 1057 Anesthesia Stop: 1227    Procedure: LEFT KNEE TOTAL ARTHROPLASTY REVISION -MEGHANN (Left: Knee) Diagnosis:       Failed total knee arthroplasty (HCC)      (Failed total knee arthroplasty (HCC) [T84.018A, Z96.659])    Surgeons: Cristobal Dobbs MD Responsible Provider: Quang Murillo MD    Anesthesia Type: general ASA Status: 4            Anesthesia Type: No value filed.    Damien Phase I: Damien Score: 10    Damien Phase II:      Anesthesia Post Evaluation    Patient location during evaluation: PACU  Patient participation: complete - patient participated  Level of consciousness: awake and alert  Airway patency: patent  Nausea & Vomiting: no vomiting and no nausea  Cardiovascular status: hemodynamically stable  Respiratory status: acceptable  Hydration status: stable  Pain management: adequate    No notable events documented.

## 2024-08-14 NOTE — PROGRESS NOTES
Discharge instructions reviewed with patient and his brother. Patient was given a front wheeled walker. Home-care to follow up.     IV removed and patient discharged home.     Has a follow up appointment with Dr. Dobbs on 8/29.

## 2024-08-14 NOTE — INTERVAL H&P NOTE
Update History & Physical    The patient's History and Physical of August 6, 2024 was reviewed with the patient and I examined the patient. There was no change. The surgical site was confirmed by the patient and me.     Plan: The risks, benefits, expected outcome, and alternative to the recommended procedure have been discussed with the patient. Patient understands and wants to proceed with the procedure.     Electronically signed by Cristobal Dobbs MD on 8/14/2024 at 9:51 AM

## 2024-08-14 NOTE — ANESTHESIA PRE PROCEDURE
T84.018A, Z96.659       Past Medical History:        Diagnosis Date   • Arthritis    • Colon cancer (HCC)    • Diastolic CHF, chronic (HCC)    • History of pulmonary embolus (PE)     on Eliquis   • Hypertension    • Type II diabetes mellitus (HCC)        Past Surgical History:        Procedure Laterality Date   • EYE SURGERY      r cat   • JOINT REPLACEMENT      right hip  l knee   • SIGMOID COLECTOMY      with chemo       Social History:    Social History     Tobacco Use   • Smoking status: Former     Current packs/day: 0.00     Types: Cigarettes     Quit date:      Years since quittin.6   • Smokeless tobacco: Never   Substance Use Topics   • Alcohol use: Never                                Counseling given: Not Answered      Vital Signs (Current):   Vitals:    24 1402   Weight: 90.7 kg (200 lb)   Height: 1.753 m (5' 9\")                                              BP Readings from Last 3 Encounters:   No data found for BP       NPO Status:                                                                                 BMI:   Wt Readings from Last 3 Encounters:   24 90.7 kg (200 lb)   24 94.3 kg (208 lb)   24 94.3 kg (208 lb)     Body mass index is 29.53 kg/m².    CBC:   Lab Results   Component Value Date/Time    WBC 6.4 2024 09:59 AM    RBC 4.74 2024 09:59 AM    HGB 14.9 2024 09:59 AM    HCT 44.2 2024 09:59 AM    MCV 93.2 2024 09:59 AM    RDW 13.3 2024 09:59 AM     2024 09:59 AM       CMP:   Lab Results   Component Value Date/Time     2024 09:59 AM    K 3.4 2024 09:59 AM     2024 09:59 AM    CO2 25 2024 09:59 AM    BUN 16 2024 09:59 AM    CREATININE 1.02 2024 09:59 AM    GLUCOSE 91 2024 09:59 AM    CALCIUM 10.4 2024 09:59 AM    BILITOT 0.5 2024 09:59 AM    ALKPHOS 86 2024 09:59 AM    AST 41 2024 09:59 AM    ALT 34 2024 09:59 AM       POC Tests: No

## 2024-08-14 NOTE — DISCHARGE INSTR - COC
Continuity of Care Form    Patient Name: Emile Nieves   :  1945  MRN:  [id number]    Admit date:  2024  Discharge date:  ***    Code Status Order: Full Code   Advance Directives:   Advance Care Flowsheet Documentation        Date/Time Healthcare Directive Type of Healthcare Directive Copy in Chart Healthcare Agent Appointed Healthcare Agent's Name Healthcare Agent's Phone Number    24 0948 No, patient does not have an advance directive for healthcare treatment  --  --  --  --  --                     Admitting Physician:  Cristobal Dobbs MD  PCP: Dov Salazar MD    Discharging Nurse: ***  Discharging Hospital Unit/Room#: OR/NONE  Discharging Unit Phone Number: ***    Emergency Contact:   Extended Emergency Contact Information  Primary Emergency Contact: Darwin NIEVES  Home Phone: [phone]  Relation: Brother/Sister  Preferred language: English   needed? No  Secondary Emergency Contact: Lui Nieves  Home Phone: [phone]  Relation: Brother/Sister    Past Surgical History:  Past Surgical History:   Procedure Laterality Date    EYE SURGERY      r cat    JOINT REPLACEMENT      right hip  l knee    SIGMOID COLECTOMY      with chemo       Immunization History:     There is no immunization history on file for this patient.    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, Z96.659    Failed total left knee replacement, initial encounter ([facility]) T84.093A       Isolation/Infection:   Isolation            No Isolation          Patient Infection Status       None to display            Nurse Assessment:  Last Vital Signs: BP (!) 157/85   Pulse 64   Temp 97.7 °F (36.5 °C) (Temporal)   Resp 16   Ht 1.753 m (5' 9\")   Wt 93.4 kg (206 lb)   SpO2 96%   BMI 30.42 kg/m²     Last documented pain score (0-10 scale): Pain Level: 0  Last Weight:   Wt Readings from Last 1 Encounters:   24 93.4 kg (206 lb)  of Unplanned Readmission:  5           Discharging to Facility/ Agency   Name:   Address:  Phone:  Fax:    Dialysis Facility (if applicable)   Name:  Address:  Dialysis Schedule:  Phone:  Fax:    / signature: {Esignature:[id number]}    PHYSICIAN SECTION    Prognosis: {Prognosis:0456134925}    Condition at Discharge: { Patient Condition:133931821}    Rehab Potential (if transferring to Rehab): {Prognosis:8737571826}    Recommended Labs or Other Treatments After Discharge: ***    Physician Certification: I certify the above information and transfer of Emile Nieves  is necessary for the continuing treatment of the diagnosis listed and that he requires {Admit to Appropriate Level of Care:64192} for {GREATER/LESS:211597120} 30 days.     Update Admission H&P: {CHP DME Changes in HandP:746028125}    PHYSICIAN SIGNATURE:  {Esignature:[id number]}

## 2024-08-15 NOTE — DISCHARGE SUMMARY
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) [T84.018A, Z96.659]  Failed total left knee replacement, initial encounter ([facility]) [T84.093A]    Discharge Diagnoses:   Principal Problem:    Failed total knee arthroplasty (HCC)  Active Problems:    Failed total left knee replacement, initial encounter ([facility])  Resolved Problems:    * No resolved hospital problems. *    Past Medical History:   Diagnosis Date    Arthritis     Colon cancer ([facility])     Diastolic CHF, chronic ([facility])     History of pulmonary embolus (PE)     on Eliquis    Hypertension     Type II diabetes mellitus ([facility])        Indication for Admission: Emile Nieves is a 78 y.o. male who presented with continued left knee knee pain after TKA that was not responsive to conservative measures.    Operations/Procedures Performed:   1. Left knee arthrotomy with patellar debridement     Hospital Course: Patient admitted on 8/14/2024 and underwent abovementioned procedure(s) on 8/14/2024. Tolerated the procedure well with no complications. Please see full operative report for further details regarding the operation.   Pain controlled post-op with IV/oral pain medication. Diet was advanced and tolerated this well. At time of discharge, the patient was tolerating oral food and hydration, voiding spontaneously, had return of bowel function, was ambulating without difficulty, and pain was controlled on oral medications. The patient was determined to be suitable for discharge and the patient felt comfortable with that decision.     Consults: Physical and Occupational Therapy    Significant Diagnostic Studies:   Stable post-op films    Discharge Exam:  /67   Pulse 80   Temp 97.5 °F (36.4 °C) (Temporal)   Resp 12   Ht 1.753 m (5' 9\")   Wt 93.4 kg (206 lb)   SpO2 94%   BMI 30.42 kg/m²      Gen - NAD, AOx3   - voiding spontaneously  Ext - ROM 0-90  Operative leg NVI  Discharge condition:  Stable    Discharge Medications:  ALL MEDICATIONS HAVE BEEN REVIEWED:  Discharge Medication List as of 8/14/2024  4:41 PM        START taking these medications    Details   oxyCODONE (ROXICODONE) 5 MG immediate release tablet Take 1 tablet by mouth every 4 hours as needed for Pain for up to 7 days. Max Daily Amount: 30 mg, Disp-42 tablet, R-0Normal      sennosides-docusate sodium (SENOKOT-S) 8.6-50 MG tablet Take 1 tablet by mouth daily, Disp-14 tablet, R-0Normal      acetaminophen (TYLENOL) 500 MG tablet Take 2 tablets by mouth 3 times daily, Disp-84 tablet, R-0Normal           CONTINUE these medications which have NOT CHANGED    Details   glipiZIDE (GLUCOTROL) 10 MG tablet Take 1 tablet by mouth 2 times daily (before meals)Historical Med      apixaban (ELIQUIS) 2.5 MG TABS tablet Take 1 tablet by mouth 2 times dailyHistorical Med      hydroCHLOROthiazide (HYDRODIURIL) 25 MG tablet 1 tablet dailyHistorical Med      loperamide (IMODIUM) 1 MG/5ML solution Take by mouth dailyHistorical Med      metFORMIN (GLUCOPHAGE) 500 MG tablet Take 1 tablet by mouth 2 times daily (with meals)Historical Med      Multiple Vitamin (MULTIVITAMIN) tablet Take 1 tablet by mouth dailyHistorical Med      valsartan (DIOVAN) 160 MG tablet Take 1 tablet by mouth dailyHistorical Med      vitamin E 100 units capsule Take 180 Units by mouth dailyHistorical Med           STOP taking these medications       FARXIGA 10 MG tablet Comments:   Reason for Stopping:             Due to patient's orthopaedic surgical procedure/condition, patient may require pain medication for up to 6-8 weeks.    Disposition: home    Patient Instructions:   Activity: Ambulate with assistive device every hour while awake.  Wound Care: Keep outer dressing in place x 10 days then remove and leave steri-strips in place until follow-up appt.      Follow-Up:  Future

## 2024-08-15 NOTE — OP NOTE
Patient: Emile Nieves                    : 1945     MRN: [id number]     Date: 24     SURGEON: Cristobal Dobbs MD     ASSISTANT: Kody ROUSE    The above listed assistant was present for the entirety of the procedure and vital for the performance of the procedure. They assisted with positioning, prepping, draping of the patient before the procedure and instrument manipulation, extremity repositioning during the procedure as well as wound closure, dressing application after the procedure. Please note that no intern, resident, or other hospital staff was available to assist during the surgery    PREOPERATIVE DIAGNOSES:     1) Failed left total knee arthroplasty     POSTOPERATIVE DIAGNOSES: Same     PROCEDURES: Left knee arthrotomy, partial patellectomy, open lateral release    ANESTHESIA: General    COMPLICATIONS: none    ESTIMATED BLOOD LOSS: <50cc    SPECIMENS: none    DRAINS: none    TOURNIQUET TIME:  20 minutes    IMPLANTS USED:   * No implants in log *     INDICATIONS: Emile Nieves is a 78 y.o. male with left total knee arthroplasty several years ago he did well initially however has noted over the last 6 months worsening anterolateral knee pain difficulty going up and down stairs.  He denies any patellar instability on review of his x-rays he was found to have a lateral tracking patella as well as subchondral erosion of the patella this was consistent with his clinical exam.   I sided to proceed with removal of his lateral facet as well as the lateral release and scar revision    The risks and benefits of surgery were discussed with the patient, as well as the alternatives to surgery and the expected post-operative course. Informed consent was obtained.     DESCRIPTION OF OPERATION: He was met in the preoperative holding area where the informed consent was reviewed and the operative site was marked.   He was then taken back to the Operating Room. After induction of

## 2024-08-19 ENCOUNTER — TELEPHONE (OUTPATIENT)
Dept: ORTHOPEDIC SURGERY | Age: 79
End: 2024-08-19

## 2024-08-19 DIAGNOSIS — T84.093A FAILED TOTAL LEFT KNEE REPLACEMENT, INITIAL ENCOUNTER (HCC): ICD-10-CM

## 2024-08-19 DIAGNOSIS — G89.29 CHRONIC PAIN OF LEFT KNEE: Primary | ICD-10-CM

## 2024-08-19 DIAGNOSIS — M25.562 CHRONIC PAIN OF LEFT KNEE: Primary | ICD-10-CM

## 2024-08-22 ENCOUNTER — OFFICE VISIT (OUTPATIENT)
Dept: ORTHOPEDIC SURGERY | Age: 79
End: 2024-08-22

## 2024-08-22 VITALS — WEIGHT: 206 LBS | HEIGHT: 69 IN | BODY MASS INDEX: 30.51 KG/M2

## 2024-08-22 DIAGNOSIS — T84.093A FAILED TOTAL LEFT KNEE REPLACEMENT, INITIAL ENCOUNTER (HCC): Primary | ICD-10-CM

## 2024-08-22 PROCEDURE — 99024 POSTOP FOLLOW-UP VISIT: CPT | Performed by: PHYSICIAN ASSISTANT

## 2024-08-22 NOTE — PROGRESS NOTES
Patient: Emile Nieves                  : 1945   MRN: [id number]   Date of Visit: 24     Physician: Cristobal Dobbs MD.     Reason for Visit: Status post Left knee arthrotomy, partial patellectomy, open lateral release     Subjective History of Present Illness:     Emile Nieves is here for regularly scheduled follow-up s/p left knee arthrotomy, partial patellectomy, open lateral release . Reports they are doing well, occasional aches and pains are controlled with over the counter medications. Taking opioid medications sparingly and continuing to wean. They do not report fevers, chills or drainage from the incision site.    He has not been compliant with his knee immobilizer.     Physical Examination??: ?   General: Patient is alert and oriented x 3 and appears comfortable.   Incision is well-approximated, no erythema, fluctuance   Able to perform SLR   Moderate to large effusion  ROM 5-105    SILT throughout LE     Radiographs: AP/lateral of the operative knee with well-positioned total knee arthroplasty. No evidence of fracture subluxation or dislocation. No evidence of migration or loosening.      Assessment and Plan?: The patient is progressing well approximately 1 weeks s/p Left knee arthrotomy, partial patellectomy, open lateral release     1. A thorough discussion was had with the patient concerning the ?postoperative course and the patient is in agreement with the plan.   2. They will continue to wean from pain medications and progress weight bearing   3. He may d/c his knee immobilizer, but we advised that he try to keep his leg as straight as possible. He will not start PT until after his visit next week.   3. He was provided with daily dressing change materials for his knee. He will call if he has any incisional issues.   4. Will see the patient in 1 weeks with  xrays at that time.    Patient was seen and evaluated by myself and Dr. Dobbs  _______________________      Will

## 2024-08-29 ENCOUNTER — OFFICE VISIT (OUTPATIENT)
Dept: ORTHOPEDIC SURGERY | Age: 79
End: 2024-08-29

## 2024-08-29 VITALS — BODY MASS INDEX: 30.51 KG/M2 | WEIGHT: 206 LBS | HEIGHT: 69 IN

## 2024-08-29 DIAGNOSIS — T84.093A FAILED TOTAL LEFT KNEE REPLACEMENT, INITIAL ENCOUNTER (HCC): Primary | ICD-10-CM

## 2024-08-29 PROCEDURE — 99024 POSTOP FOLLOW-UP VISIT: CPT | Performed by: ORTHOPAEDIC SURGERY

## 2024-08-29 RX ORDER — SULFAMETHOXAZOLE/TRIMETHOPRIM 800-160 MG
1 TABLET ORAL 2 TIMES DAILY
Qty: 20 TABLET | Refills: 0 | Status: SHIPPED | OUTPATIENT
Start: 2024-08-29 | End: 2024-09-08

## 2024-08-29 NOTE — PROGRESS NOTES
Patient: Emile Nieves                  : 1945   MRN: [id number]   Date of Visit: 24     Physician: Cristobal Dobbs MD.     Reason for Visit: Status post Left knee arthrotomy, partial patellectomy, open lateral release     Subjective History of Present Illness:     Emile Nieves is here for regularly scheduled follow-up s/p left knee arthrotomy, partial patellectomy, open lateral release . Reports they are doing well, occasional aches and pains are controlled with over the counter medications. They do not report fevers, chills or drainage from the incision site.      Physical Examination??: ?   General: Patient is alert and oriented x 3 and appears comfortable.   Incision is well-approximated. Mild swelling. Moderate erythema about the anterior knee  Able to perform SLR   Moderate to large effusion  ROM 0-110  SILT throughout LE     Radiographs: AP/lateral of the operative knee with well-positioned total knee arthroplasty. No evidence of fracture subluxation or dislocation. No evidence of migration or loosening.      Assessment and Plan?: The patient is progressing well approximately 1 weeks s/p Left knee arthrotomy, partial patellectomy, open lateral release     1. A thorough discussion was had with the patient concerning the ?postoperative course and the patient is in agreement with the plan.   2. They will continue to wean from pain medications and progress weight bearing   3. Will send a 10 course of Bactrim   4. Will see the patient in 2 weeks. He will not need x-rays. He may begin physical therapy.     Patient was seen and evaluated by myself and Dr. Dobbs  _______________________      Will OMA [place]

## 2024-09-23 ENCOUNTER — OFFICE VISIT (OUTPATIENT)
Dept: ORTHOPEDIC SURGERY | Age: 79
End: 2024-09-23

## 2024-09-23 VITALS — WEIGHT: 206 LBS | BODY MASS INDEX: 30.51 KG/M2 | HEIGHT: 69 IN

## 2024-09-23 DIAGNOSIS — T84.018D FAILURE OF TOTAL KNEE REPLACEMENT, SUBSEQUENT ENCOUNTER: Primary | ICD-10-CM

## 2024-09-23 DIAGNOSIS — Z96.659 FAILURE OF TOTAL KNEE REPLACEMENT, SUBSEQUENT ENCOUNTER: Primary | ICD-10-CM

## 2024-09-23 PROCEDURE — 99024 POSTOP FOLLOW-UP VISIT: CPT | Performed by: PHYSICIAN ASSISTANT

## 2025-03-20 ENCOUNTER — OFFICE VISIT (OUTPATIENT)
Dept: ORTHOPEDIC SURGERY | Age: 80
End: 2025-03-20
Payer: COMMERCIAL

## 2025-03-20 DIAGNOSIS — Z96.659 FAILURE OF TOTAL KNEE REPLACEMENT, SUBSEQUENT ENCOUNTER: Primary | ICD-10-CM

## 2025-03-20 DIAGNOSIS — T84.018D FAILURE OF TOTAL KNEE REPLACEMENT, SUBSEQUENT ENCOUNTER: Primary | ICD-10-CM

## 2025-03-20 PROCEDURE — 99213 OFFICE O/P EST LOW 20 MIN: CPT | Performed by: ORTHOPAEDIC SURGERY

## 2025-03-20 PROCEDURE — 1159F MED LIST DOCD IN RCRD: CPT | Performed by: ORTHOPAEDIC SURGERY

## 2025-03-20 PROCEDURE — 1123F ACP DISCUSS/DSCN MKR DOCD: CPT | Performed by: ORTHOPAEDIC SURGERY

## (undated) DEVICE — SUTURE MONOCRYL + SZ 3-0 L27IN ABSRB UD PS1 L24MM 3/8 CIR REV MCP936H

## (undated) DEVICE — MARKER,SKIN,WI/RULER AND LABELS: Brand: MEDLINE

## (undated) DEVICE — APPLICATOR MEDICATED 26 CC SOLUTION HI LT ORNG CHLORAPREP

## (undated) DEVICE — SUTURE MONOCRYL PLUS UNDYED PS 3-0 45CM STRATAFIX SPIRAL

## (undated) DEVICE — 2108 SERIES SAGITTAL BLADE, NO OFFSET  (12.4 X 1.19 X 82.1MM)

## (undated) DEVICE — BANDAGE COMPR W6INXL10YD ST M E WHITE/BEIGE

## (undated) DEVICE — ZIMMER® STERILE DISPOSABLE TOURNIQUET CUFF WITH PLC, DUAL PORT, SINGLE BLADDER, 34 IN. (86 CM)

## (undated) DEVICE — SUTURE VICRYL + SZ 1 L36IN ABSRB UD L36MM CT-1 1/2 CIR VCP947H

## (undated) DEVICE — HOOD: Brand: FLYTE

## (undated) DEVICE — STERILE POLYISOPRENE POWDER-FREE SURGICAL GLOVES WITH EMOLLIENT COATING: Brand: PROTEXIS

## (undated) DEVICE — SYSTEM VAC MIX SGL DBL CLEARMIX 10 PER CA

## (undated) DEVICE — SUTURE VICRYL SZ 0 L36IN ABSRB UD CT-1 L36MM 1/2 CIR TAPR PNT VCP946H

## (undated) DEVICE — SHEET,DRAPE,53X77,STERILE: Brand: MEDLINE

## (undated) DEVICE — TOTAL KNEE: Brand: MEDLINE INDUSTRIES, INC.

## (undated) DEVICE — SUTURE VICRYL + SZ 2-0 L36IN ABSRB UD L36MM CT-1 1/2 CIR VCP945H

## (undated) DEVICE — GLOVE ORANGE PI 8   MSG9080

## (undated) DEVICE — HOOD, PEEL-AWAY: Brand: FLYTE

## (undated) DEVICE — GLOVE ORTHO 7 1/2   MSG9475

## (undated) DEVICE — HYPODERMIC SAFETY NEEDLE: Brand: MAGELLAN

## (undated) DEVICE — SPLINT KNEE UNIV FOR LESS THAN 36IN L20IN FOAM LAM E CNTCT

## (undated) DEVICE — STRYKER PERFORMANCE SERIES SAGITTAL BLADE: Brand: STRYKER PERFORMANCE SERIES

## (undated) DEVICE — SUTURE ABSORBABLE MONOFILAMENT 1 MO-4 36 CM 36 MM VIO PDS +

## (undated) DEVICE — DRAPE,TOWEL,LARGE,INVISISHIELD: Brand: MEDLINE

## (undated) DEVICE — HANDPIECE SET WITH HIGH FLOW TIP AND SUCTION TUBE: Brand: INTERPULSE

## (undated) DEVICE — BOWL MED L 32OZ PLAS W/ MOLD GRAD EZ OPN PEEL PCH

## (undated) DEVICE — DRESSING CNTCT 4X12IN IS THERABOND 3D

## (undated) DEVICE — ADHESIVE SKIN CLOSURE WND 8.661X1.5 IN 22 CM LIQUIBAND SECUR

## (undated) DEVICE — YANKAUER,OPEN TIP,W/O VENT,STERILE: Brand: MEDLINE INDUSTRIES, INC.

## (undated) DEVICE — STERILE TOTAL KNEE DRAPE PACK: Brand: CARDINAL HEALTH